# Patient Record
Sex: FEMALE | Race: BLACK OR AFRICAN AMERICAN | URBAN - METROPOLITAN AREA
[De-identification: names, ages, dates, MRNs, and addresses within clinical notes are randomized per-mention and may not be internally consistent; named-entity substitution may affect disease eponyms.]

---

## 2017-03-11 DIAGNOSIS — Z00.00 ROUTINE GENERAL MEDICAL EXAMINATION AT A HEALTH CARE FACILITY: ICD-10-CM

## 2017-03-27 ENCOUNTER — OFFICE VISIT (OUTPATIENT)
Dept: INTERNAL MEDICINE CLINIC | Age: 57
End: 2017-03-27

## 2017-03-27 VITALS
OXYGEN SATURATION: 98 % | HEART RATE: 58 BPM | DIASTOLIC BLOOD PRESSURE: 82 MMHG | SYSTOLIC BLOOD PRESSURE: 126 MMHG | BODY MASS INDEX: 30.76 KG/M2 | RESPIRATION RATE: 15 BRPM | HEIGHT: 67 IN | WEIGHT: 196 LBS | TEMPERATURE: 97.4 F

## 2017-03-27 DIAGNOSIS — Z00.00 ROUTINE GENERAL MEDICAL EXAMINATION AT A HEALTH CARE FACILITY: Primary | ICD-10-CM

## 2017-03-27 DIAGNOSIS — K21.9 GASTROESOPHAGEAL REFLUX DISEASE WITHOUT ESOPHAGITIS: ICD-10-CM

## 2017-03-27 DIAGNOSIS — I10 BENIGN HYPERTENSION: ICD-10-CM

## 2017-03-27 DIAGNOSIS — Z79.899 ENCOUNTER FOR LONG-TERM (CURRENT) DRUG USE: ICD-10-CM

## 2017-03-27 RX ORDER — AMLODIPINE AND VALSARTAN 5; 320 MG/1; MG/1
TABLET ORAL
Qty: 90 TAB | Refills: 1 | Status: SHIPPED | OUTPATIENT
Start: 2017-03-27 | End: 2017-09-25 | Stop reason: SDUPTHER

## 2017-03-27 RX ORDER — CHOLECALCIFEROL (VITAMIN D3) 125 MCG
5 CAPSULE ORAL
Qty: 90 TAB | Refills: 3 | Status: SHIPPED | OUTPATIENT
Start: 2017-03-27

## 2017-03-27 RX ORDER — BISOPROLOL FUMARATE AND HYDROCHLOROTHIAZIDE 10; 6.25 MG/1; MG/1
TABLET ORAL
Qty: 90 TAB | Refills: 1 | Status: SHIPPED | OUTPATIENT
Start: 2017-03-27 | End: 2017-09-25 | Stop reason: SDUPTHER

## 2017-03-27 RX ORDER — POLYETHYLENE GLYCOL 3350 17 G/17G
17 POWDER, FOR SOLUTION ORAL DAILY
Qty: 1 EACH | Refills: 11 | Status: SHIPPED | OUTPATIENT
Start: 2017-03-27 | End: 2017-09-25 | Stop reason: ALTCHOICE

## 2017-03-27 RX ORDER — CETIRIZINE HCL 10 MG
10 TABLET ORAL DAILY
Qty: 90 TAB | Refills: 3 | Status: SHIPPED | OUTPATIENT
Start: 2017-03-27

## 2017-03-27 NOTE — MR AVS SNAPSHOT
Visit Information Date & Time Provider Department Dept. Phone Encounter #  
 3/27/2017  8:20 AM Avis Ahumada MD [place] [id number] [facility] [phone] Follow-up Instructions Return in about 6 months (around 9/27/2017) for hypertension. Upcoming Health Maintenance Date Due  
 PAP AKA CERVICAL CYTOLOGY 4/1/2018 BREAST CANCER SCRN MAMMOGRAM 12/2/2018 COLONOSCOPY 5/9/2022 DTaP/Tdap/Td series (2 - Td) 7/9/2025 Allergies as of 3/27/2017  Review Complete On: 3/27/2017 By: Avis Ahumada MD  
  
 Severity Noted Reaction Type Reactions Ace Inhibitors  02/24/2012    Cough Aleve [Naproxen Sodium]  02/24/2012    Nausea and Vomiting Aspirin  02/24/2012    Nausea Only Ibuprofen  02/24/2012    Nausea and Vomiting Penicillins  02/24/2012    Vertigo  
 syncope Current Immunizations  Reviewed on 7/9/2015 Name Date Tdap 7/9/2015 Zoster Vaccine, Live 1/19/2014 Not reviewed this visit You Were Diagnosed With   
  
 Codes Comments Routine general medical examination at a health care facility    -  Primary ICD-10-CM: Z00.00 ICD-9-CM: V70.0 Vitals BP Pulse Temp Resp Height(growth percentile) Weight(growth percentile) 126/82 (!) 58 97.4 °F (36.3 °C) (Oral) 15 5' 6.5\" (1.689 m) 196 lb (88.9 kg) SpO2 BMI OB Status Smoking Status 98% 31.16 kg/m2 Hysterectomy Never Smoker Vitals History BMI and BSA Data Body Mass Index Body Surface Area  
 31.16 kg/m 2 2.04 m 2 Preferred Pharmacy Pharmacy Name Phone [id number] [address] [phone] Your Updated Medication List  
  
   
This list is accurate as of: 3/27/17  9:36 AM.  Always use your most recent med list.  
  
  
  
  
 acetaminophen 650 mg CR tablet Commonly known as:  TYLENOL ARTHRITIS  
2 tablets every 8 hours as needed. amLODIPine-valsartan 5-320 mg per tablet Commonly known as:  EXFORGE  
take 1 tablet by mouth daily Bifidobacterium Infantis 4 mg Cap Commonly known as:  ALIGN  
take 1 capsule by mouth once daily  
  
 bisoprolol-hydroCHLOROthiazide 10-6.25 mg per tablet Commonly known as:  ZIAC  
take 1 tablet by mouth once daily  
  
 cetirizine 10 mg tablet Commonly known as:  ZYRTEC Take 1 Tab by mouth daily. cholecalciferol 1,000 unit Cap Commonly known as:  VITAMIN D3 Take 1 Cap by mouth daily. FISH OIL 1,000 mg Cap Generic drug:  omega-3 fatty acids-vitamin e Take 1 Cap by mouth daily. hydrocort-pramox-skin clnsr#16 2.35-1 % Ktct Commonly known as:  ZYPRAM  
Apply bid as needed  
  
 licorice,deglycyrrhizin (bulk) Powd Take 1 Tab by mouth daily as needed (chewable tablet). melatonin Tab tablet Take 1 Tab by mouth nightly. NexIUM 24HR 22.3 mg Cpdr  
Generic drug:  esomeprazole magnesium  
take 1 capsule by mouth once daily  
  
 peg 400-propylene glycol (PF) 0.4-0.3 % Dpet ophthalmic solution Commonly known as:  SYSTANE Administer 2 Drops to both eyes as needed for Ocular Dryness. polyethylene glycol 17 gram packet Commonly known as:  Deliliah Hefty Take 1 Packet by mouth daily. raNITIdine 150 mg tablet Commonly known as:  ZANTAC  
take 1 tablet by mouth twice a day Prescriptions Sent to Pharmacy Refills  
 cetirizine (ZYRTEC) 10 mg tablet 3 Sig: Take 1 Tab by mouth daily. Class: Normal  
 Pharmacy: [facility] Ph #: [phone] Route: Oral  
 melatonin tab tablet 3 Sig: Take 1 Tab by mouth nightly. Class: Normal  
 Pharmacy: [facility] Ph #: [phone] Route: Oral  
 hydrocort-pramox-skin clnsr#16 (ZYPRAM) 2.35-1 % ktct 5 Sig: Apply bid as needed  Class: Normal  
 [facility] [address] [place] [address] E Ph #: [phone]  
 polyethylene glycol (MIRALAX) 17 gram packet 11 Sig: Take 1 Packet by mouth daily. Class: Normal  
 [facility] Ph #: [phone] Route: Oral  
 bisoprolol-hydroCHLOROthiazide (ZIAC) 10-6.25 mg per tablet 1 Sig: take 1 tablet by mouth once daily Class: Normal  
 Pharm[facility] [address] [place] [address] E Ph #: [phone]  
 amLODIPine-valsartan (EXFORGE) 5-320 mg per tablet 1 Sig: take 1 tablet by mouth daily Class: Normal  
 Pharmacy: [facility], [address] [place] [address] E Ph #: [phone] Bifidobacterium Infantis (ALIGN) 4 mg cap 1 Sig: take 1 capsule by mouth once daily Class: Normal  
 Pharmacy: [id number], [address] [place] [address] Ph #: [phone] We Performed the Following AMB POC EKG ROUTINE W/ 12 LEADS, INTER & REP [89765 CPT(R)] Follow-up Instructions Return in about 6 months (around 9/27/2017) for hypertension. Introducing [facility]! Olena Elder introduces Codesion patient portal. Now you can access parts of your medical record, email your doctor's office, and request medication refills online. 1. In your internet browser, go to [email or web address] Quantique 2. Click on the First Time User? Click Here link in the Sign In box. You will see the New Member Sign Up page. 3. Enter your Codesion Access Code exactly as it appears below. You will not need to use this code after youve completed the sign-up process. If you do not sign up before the expiration date, you must request a new code. · Codesion Access Code: [email or web address] Expires: 6/25/2017  9:16 AM 
 
4.  Enter the last four digits of your Social Security Number (xxxx) and Date of Birth (mm/dd/yyyy) as indicated and click Submit. You will be taken to the next sign-up page. 5. Create a 27 bards ID. This will be your 27 bards login ID and cannot be changed, so think of one that is secure and easy to remember. 6. Create a 27 bards password. You can change your password at any time. 7. Enter your Password Reset Question and Answer. This can be used at a later time if you forget your password. 8. Enter your e-mail address. You will receive e-mail notification when new information is available in [address] 9. Click Sign Up. You can now view and download portions of your medical record. 10. Click the Download Summary menu link to download a portable copy of your medical information. If you have questions, please visit the Frequently Asked Questions section of the 27 bards website. Remember, 27 bards is NOT to be used for urgent needs. For medical emergencies, dial 911. Now available from your iPhone and Android! Please provide this summary of care documentation to your next provider. Your primary care clinician is listed as Caterina Weston. If you have any questions after today's visit, please call [phone].

## 2017-03-27 NOTE — ACP (ADVANCE CARE PLANNING)
-the [place] Advanced Directive for Healthcare template given to patient for review and completion. Patient asked to provide us with a copy once it is completed.

## 2017-03-27 NOTE — PROGRESS NOTES
Subjective:      Lou Bañuelos is a 61 y.o. female who presents today for her annual and follow up of her hypertension. Gyn care is provided by Dr. Jenny Davidson. - last visit was 4/2016  Screening mammogram [facility] at [facility] - date- 12/2/16  Screening colonoscopy was last completed with Dr. Pieter Haas in 2012    Exercise -  Walking every day    She would like to switch her zantac to prilosec. She has quite a bit of food allergies and is already limited in what she eats, but she still has quite a bit of reflux symptoms when she stops her zantac. Patient Active Problem List   Diagnosis Code    Pap smear for cervical cancer screening Z12.4    History of screening mammography Z92.89    HTN (hypertension) I10    Environmental allergies Z91.09    Hyperlipemia E78.5    Hiatal hernia K44.9    Screen for colon cancer Z12.11    Vitamin D deficiency E55.9    GERD (gastroesophageal reflux disease) K21.9     Current Outpatient Prescriptions   Medication Sig Dispense Refill    cetirizine (ZYRTEC) 10 mg tablet Take 1 Tab by mouth daily. 90 Tab 3    melatonin tab tablet Take 1 Tab by mouth nightly. 90 Tab 3    hydrocort-pramox-skin clnsr#16 (ZYPRAM) 2.35-1 % ktct Apply bid as needed 1 Each 5    polyethylene glycol (MIRALAX) 17 gram packet Take 1 Packet by mouth daily. 1 Each 11    bisoprolol-hydroCHLOROthiazide (ZIAC) 10-6.25 mg per tablet take 1 tablet by mouth once daily 90 Tab 1    amLODIPine-valsartan (EXFORGE) 5-320 mg per tablet take 1 tablet by mouth daily 90 Tab 1    Bifidobacterium Infantis (ALIGN) 4 mg cap take 1 capsule by mouth once daily 90 Cap 1    LICORICE,DEGLYCYRRHIZINATED (LICORICE,DEGLYCYRRHIZIN, BULK,) powd Take 1 Tab by mouth daily as needed (chewable tablet). 30 Tab 12    omega-3 fatty acids-vitamin e (FISH OIL) 1,000 mg cap Take 1 Cap by mouth daily.  30 Cap 12    ranitidine (ZANTAC) 150 mg tablet take 1 tablet by mouth twice a day 180 Tab 1    NEXIUM 24HR 22.3 mg capsule take 1 capsule by mouth once daily 42 Cap 5    acetaminophen (TYLENOL ARTHRITIS) 650 mg CR tablet 2 tablets every 8 hours as needed. 50 Each 11    Cholecalciferol, Vitamin D3, (VITAMIN D3) 1,000 unit cap Take 1 Cap by mouth daily. 100 Cap prn    peg 400-propylene glycol (SYSTANE) 0.4-0.3 % Dpet ophthalmic solution Administer 2 Drops to both eyes as needed for Ocular Dryness. 1 Each 11        Review of Systems    A comprehensive review of systems was negative except for that written in the HPI. Objective: Wt Readings from Last 3 Encounters:   03/27/17 196 lb (88.9 kg)   09/12/16 196 lb (88.9 kg)   03/04/16 200 lb (90.7 kg)     Temp Readings from Last 3 Encounters:   03/27/17 97.4 °F (36.3 °C) (Oral)   09/12/16 98 °F (36.7 °C) (Oral)   03/04/16 98.1 °F (36.7 °C) (Oral)     BP Readings from Last 3 Encounters:   03/27/17 (!) 132/94 recheck 126/82   09/12/16 126/80   03/04/16 135/85     Pulse Readings from Last 3 Encounters:   03/27/17 (!) 58   09/12/16 89   03/04/16 62      Visit Vitals    /82    Pulse (!) 58    Temp 97.4 °F (36.3 °C) (Oral)    Resp 15    Ht 5' 6.5\" (1.689 m)    Wt 196 lb (88.9 kg)    SpO2 98%    BMI 31.16 kg/m2     General:  Alert, cooperative, no distress, appears stated age. Head:  Normocephalic, without obvious abnormality, atraumatic. Eyes:  Conjunctivae/corneas clear. PERRL, EOMs intact. Ears:  Normal TMs and external ear canals both ears. Nose: Nares normal. Septum midline. Mucosa normal. No drainage or sinus tenderness. Throat: Lips, mucosa, and tongue normal. Teeth and gums normal.   Neck: Supple, symmetrical, trachea midline, no adenopathy, thyroid: no enlargement/tenderness/nodules, no carotid bruit and no JVD. Back:   Symmetric, no curvature. ROM normal. No CVA tenderness. Lungs:   Clear to auscultation bilaterally. Chest wall:  No tenderness or deformity. Heart:  Regular rate and rhythm, S1, S2 normal, no murmur, click, rub or gallop.    Breast Exam:  No tenderness, masses, or nipple abnormality. Abdomen:   Soft, non-tender. Bowel sounds normal. No masses,  No organomegaly. Extremities: Extremities normal, atraumatic, no cyanosis or edema. Pulses: 2+ and symmetric all extremities. Skin: Skin color, texture, turgor normal. No rashes or lesions. Lymph nodes: Cervical, supraclavicular, and axillary nodes normal.   Neurologic: CNII-XII intact. Normal strength, sensation and reflexes throughout. Assessment/Plan:     1. Routine general medical examination at a health care facility  -labs drawn prior to office visit. Results discussed with patient.   -due for screening colonoscopy this year.   -up to date with screening mammogram  -up to date with immunization.     - AMB POC EKG ROUTINE W/ 12 LEADS, INTER & REP    Also, she has additional complaints of the following --.     2. Gastroesophageal reflux disease without esophagitis  -discussed at length the potential of developing renal failure and dementia with long term use of prilosec.   -strongly recommended she reevaluate her diet and cut out foods that aggrevate her GERD. She is concerned that with her many food allergies, she may have a very limited diet.   -may continue use of zantac for now    3. Benign hypertension  -I evaluated and recommended to continue current doses of medications. 4. Encounter for long-term (current) drug use    Orders Placed This Encounter    AMB POC EKG ROUTINE W/ 12 LEADS, INTER & REP     Order Specific Question:   Reason for Exam:     Answer:   complete physical    cetirizine (ZYRTEC) 10 mg tablet     Sig: Take 1 Tab by mouth daily. Dispense:  90 Tab     Refill:  3    melatonin tab tablet     Sig: Take 1 Tab by mouth nightly.      Dispense:  90 Tab     Refill:  3    hydrocort-pramox-skin clnsr#16 (ZYPRAM) 2.35-1 % ktct     Sig: Apply bid as needed     Dispense:  1 Each     Refill:  5    polyethylene glycol (MIRALAX) 17 gram packet     Sig: Take 1 Packet by mouth daily.     Dispense:  1 Each     Refill:  11    bisoprolol-hydroCHLOROthiazide (ZIAC) 10-6.25 mg per tablet     Sig: take 1 tablet by mouth once daily     Dispense:  90 Tab     Refill:  1    amLODIPine-valsartan (EXFORGE) 5-320 mg per tablet     Sig: take 1 tablet by mouth daily     Dispense:  90 Tab     Refill:  1    Bifidobacterium Infantis (ALIGN) 4 mg cap     Sig: take 1 capsule by mouth once daily     Dispense:  90 Cap     Refill:  1          Follow-up Disposition:     Follow up in 6 months     Return if symptoms worsen or fail to improve. Advised patient to call back or return to office if symptoms worsen/change/persist.     Discussed expected course/resolution/complications of diagnosis in detail with patient. Medication risks/benefits/costs/interactions/alternatives discussed with patient. Patient was given an after visit summary which includes diagnoses, current medications, & vitals. Patient expressed understanding with the diagnosis and plan.

## 2017-03-31 ENCOUNTER — TELEPHONE (OUTPATIENT)
Dept: INTERNAL MEDICINE CLINIC | Age: 57
End: 2017-03-31

## 2017-03-31 NOTE — TELEPHONE ENCOUNTER
Received incoming fax from [address] [place] [address] notifying PCP that Zypram is no longer in the market. Called pt to advise of this information and obtain further information on what she would like to switch to. Pt states that she is willing to switch to whatever medication Dr. Coni Lundborg finds as a good alternative.

## 2017-04-03 NOTE — TELEPHONE ENCOUNTER
Call to Ms. Hannah Pierce and advised that an Rx for preparation H had been sent to her local pharmacy. Ms. Hannah Pierce acknowledged understanding and all questions were answered to patients satisfaction. No further questions or concerns at this time.

## 2017-09-25 ENCOUNTER — OFFICE VISIT (OUTPATIENT)
Dept: INTERNAL MEDICINE CLINIC | Age: 57
End: 2017-09-25

## 2017-09-25 ENCOUNTER — TELEPHONE (OUTPATIENT)
Dept: INTERNAL MEDICINE CLINIC | Age: 57
End: 2017-09-25

## 2017-09-25 VITALS
OXYGEN SATURATION: 97 % | DIASTOLIC BLOOD PRESSURE: 86 MMHG | HEIGHT: 67 IN | RESPIRATION RATE: 14 BRPM | HEART RATE: 73 BPM | SYSTOLIC BLOOD PRESSURE: 130 MMHG | WEIGHT: 193 LBS | TEMPERATURE: 98.8 F | BODY MASS INDEX: 30.29 KG/M2

## 2017-09-25 DIAGNOSIS — G47.9 SLEEP DISTURBANCE: ICD-10-CM

## 2017-09-25 DIAGNOSIS — K64.9 HEMORRHOIDS, UNSPECIFIED HEMORRHOID TYPE: ICD-10-CM

## 2017-09-25 DIAGNOSIS — I10 BENIGN HYPERTENSION: Primary | ICD-10-CM

## 2017-09-25 DIAGNOSIS — Z12.11 COLON CANCER SCREENING: ICD-10-CM

## 2017-09-25 DIAGNOSIS — Z79.899 ENCOUNTER FOR LONG-TERM (CURRENT) USE OF MEDICATIONS: ICD-10-CM

## 2017-09-25 RX ORDER — AMLODIPINE AND VALSARTAN 5; 320 MG/1; MG/1
TABLET ORAL
Qty: 90 TAB | Refills: 1 | Status: SHIPPED | OUTPATIENT
Start: 2017-09-25 | End: 2017-10-02 | Stop reason: SDUPTHER

## 2017-09-25 RX ORDER — BISOPROLOL FUMARATE AND HYDROCHLOROTHIAZIDE 10; 6.25 MG/1; MG/1
TABLET ORAL
Qty: 90 TAB | Refills: 1 | Status: SHIPPED | OUTPATIENT
Start: 2017-09-25 | End: 2017-10-02 | Stop reason: SDUPTHER

## 2017-09-25 RX ORDER — DEXTROMETHORPHAN HYDROBROMIDE, GUAIFENESIN 5; 100 MG/5ML; MG/5ML
LIQUID ORAL
Qty: 100 TAB | Refills: 3 | Status: SHIPPED | OUTPATIENT
Start: 2017-09-25

## 2017-09-25 RX ORDER — ACETAMINOPHEN/DIPHENHYDRAMINE 500MG-25MG
TABLET ORAL
Qty: 60 TAB | Refills: 11 | Status: SHIPPED | OUTPATIENT
Start: 2017-09-25

## 2017-09-25 RX ORDER — GLUCOSAMINE SULFATE 1500 MG
1000 POWDER IN PACKET (EA) ORAL DAILY
Qty: 100 CAP | Refills: 3 | Status: SHIPPED | OUTPATIENT
Start: 2017-09-25

## 2017-09-25 NOTE — PROGRESS NOTES
Chief Complaint   Patient presents with    Hypertension     6 MONTH FOLLOW UP     1. Have you been to the ER, urgent care clinic since your last visit? Hospitalized since your last visit? No    2. Have you seen or consulted any other health care providers outside of [facility] [place] since your last visit? Include any pap smears or colon screening.  No

## 2017-09-25 NOTE — PROGRESS NOTES
Subjective:      Edwige Alba is a 61 y.o. female who presents today for follow up of her hypertension. She wakes every night between 1-3am.  Has been ongoing for some time. She denies any chest pain, shortness of breath, syncope, headaches, nausea/vomiting, or any bowel changes. She is due for her screening colonoscopy, but would like to see a colorectal surgeon such that they can do something about her hemorrhoids as well. She would like to see Dr. Gab Sommer. Patient Active Problem List   Diagnosis Code    Pap smear for cervical cancer screening Z12.4    History of screening mammography Z92.89    HTN (hypertension) I10    Environmental allergies Z91.09    Hyperlipemia E78.5    Hiatal hernia K44.9    Screen for colon cancer Z12.11    Vitamin D deficiency E55.9    GERD (gastroesophageal reflux disease) K21.9     Current Outpatient Prescriptions   Medication Sig Dispense Refill    bisoprolol-hydroCHLOROthiazide (ZIAC) 10-6.25 mg per tablet take 1 tablet by mouth once daily 90 Tab 1    amLODIPine-valsartan (EXFORGE) 5-320 mg per tablet take 1 tablet by mouth daily 90 Tab 1    acetaminophen (TYLENOL ARTHRITIS) 650 mg CR tablet 2 tablets every 8 hours as needed. 100 Tab 3    cholecalciferol (VITAMIN D3) 1,000 unit cap Take 1 Cap by mouth daily. 100 Cap 3    diphenhydrAMINE-acetaminophen (TYLENOL PM EXTRA STRENGTH)  mg tab 1 -2 tab at bedtime as needed 60 Tab 11    ALIGN 4 mg cap take 1 capsule by mouth once daily 84 Cap 3    raNITIdine (ZANTAC) 150 mg tablet take 1 tablet by mouth twice a day 180 Tab 1    cetirizine (ZYRTEC) 10 mg tablet Take 1 Tab by mouth daily. 90 Tab 3    melatonin tab tablet Take 1 Tab by mouth nightly. 90 Tab 3    omega-3 fatty acids-vitamin e (FISH OIL) 1,000 mg cap Take 1 Cap by mouth daily. 30 Cap 12    peg 400-propylene glycol (SYSTANE) 0.4-0.3 % Dpet ophthalmic solution Administer 2 Drops to both eyes as needed for Ocular Dryness.  1 Each 11 Review of Systems    Pertinent items are noted in HPI. Objective:     Visit Vitals    /86 (BP 1 Location: Left arm, BP Patient Position: Sitting)    Pulse 73    Temp 98.8 °F (37.1 °C) (Oral)    Resp 14    Ht 5' 6.5\" (1.689 m)    Wt 193 lb (87.5 kg)    SpO2 97%    BMI 30.68 kg/m2     General appearance: alert, cooperative, no distress, appears stated age  Head: Normocephalic, without obvious abnormality, atraumatic  Neck: supple, symmetrical, trachea midline, no adenopathy, no carotid bruit and no JVD  Lungs: clear to auscultation bilaterally  Heart: regular rate and rhythm, S1, S2 normal, no murmur, click, rub or gallop  Abdomen: soft, non-tender. Bowel sounds normal. No masses,  no organomegaly  Extremities: extremities normal, atraumatic, no cyanosis or edema  Pulses: 2+ and symmetric    Assessment/Plan:     1. Hemorrhoids, unspecified hemorrhoid type  -referred to Dr. Rosey Soto    2. Colon cancer screening  -due at this time. Will get done with Dr. Rosey Soto    3. Benign hypertension  -I evaluated and recommended to continue current doses of medications. 4. Encounter for long-term (current) use of medications    5.  Sleep disturbance  -recommended good sleep hygiene  -recommended use of tylenol pm 1-2 tab at bedtime for next month to help reset sleep pattern    Orders Placed This Encounter    REFERRAL TO COLON AND RECTAL SURGERY     Referral Priority:   Routine     Referral Type:   Consultation     Referral Reason:   Specialty Services Required     Referred to Provider:   Jono Sarabia MD     Requested Specialty:   Colon and Rectal Surgery    bisoprolol-hydroCHLOROthiazide [facility]) 10-6.25 mg per tablet     Sig: take 1 tablet by mouth once daily     Dispense:  90 Tab     Refill:  1    amLODIPine-valsartan (EXFORGE) 5-320 mg per tablet     Sig: take 1 tablet by mouth daily     Dispense:  90 Tab Refill:  1    acetaminophen (TYLENOL ARTHRITIS) 650 mg CR tablet     Si tablets every 8 hours as needed. Dispense:  100 Tab     Refill:  3    cholecalciferol (VITAMIN D3) 1,000 unit cap     Sig: Take 1 Cap by mouth daily. Dispense:  100 Cap     Refill:  3    diphenhydrAMINE-acetaminophen (TYLENOL PM EXTRA STRENGTH)  mg tab     Si -2 tab at bedtime as needed     Dispense:  60 Tab     Refill:  11          Follow-up Disposition:     Follow up in 6 months     Return if symptoms worsen or fail to improve. Advised patient to call back or return to office if symptoms worsen/change/persist.     Discussed expected course/resolution/complications of diagnosis in detail with patient. Medication risks/benefits/costs/interactions/alternatives discussed with patient. Patient was given an after visit summary which includes diagnoses, current medications, & vitals. Patient expressed understanding with the diagnosis and plan.

## 2017-09-25 NOTE — MR AVS SNAPSHOT
Visit Information Date & Time Provider Department Dept. Phone Encounter #  
 9/25/2017 11:40 AM Amanda Jackman MD [facility] [phone] Follow-up Instructions Return in about 6 months (around 3/25/2018) for hypertension. Upcoming Health Maintenance Date Due  
 PAP AKA CERVICAL CYTOLOGY 4/1/2018 BREAST CANCER SCRN MAMMOGRAM 12/2/2018 COLONOSCOPY 5/9/2022 DTaP/Tdap/Td series (2 - Td) 7/9/2025 Allergies as of 9/25/2017  Review Complete On: 9/25/2017 By: Amanda Jackman MD  
  
 Severity Noted Reaction Type Reactions Ace Inhibitors  02/24/2012    Cough Aleve [Naproxen Sodium]  02/24/2012    Nausea and Vomiting Aspirin  02/24/2012    Nausea Only Ibuprofen  02/24/2012    Nausea and Vomiting Penicillins  02/24/2012    Vertigo  
 syncope Current Immunizations  Reviewed on 7/9/2015 Name Date Tdap 7/9/2015 Zoster Vaccine, Live 1/19/2014 Not reviewed this visit You Were Diagnosed With   
  
 Codes Comments Benign hypertension    -  Primary ICD-10-CM: I10 
ICD-9-CM: 401.1 Hemorrhoids, unspecified hemorrhoid type     ICD-10-CM: K64.9 ICD-9-CM: 455.6 Colon cancer screening     ICD-10-CM: Z12.11 ICD-9-CM: V76.51 Encounter for long-term (current) use of medications     ICD-10-CM: Z79.899 ICD-9-CM: V58.69 Essential hypertension with goal blood pressure less than 130/85     ICD-10-CM: I10 
ICD-9-CM: 401.9 Vitals BP Pulse Temp Resp Height(growth percentile) Weight(growth percentile) 130/86 (BP 1 Location: Left arm, BP Patient Position: Sitting) 73 98.8 °F (37.1 °C) (Oral) 14 5' 6.5\" (1.689 m) 193 lb (87.5 kg) SpO2 BMI OB Status Smoking Status 97% 30.68 kg/m2 Hysterectomy Never Smoker BMI and BSA Data Body Mass Index Body Surface Area  
 30.68 kg/m 2 2.03 m 2 Preferred Pharmacy Pharmacy Name Phone [id number]  [address] [address] [place] [phone] Your Updated Medication List  
  
   
This list is accurate as of: 17 12:41 PM.  Always use your most recent med list.  
  
  
  
  
 acetaminophen 650 mg CR tablet Commonly known as:  TYLENOL ARTHRITIS  
2 tablets every 8 hours as needed. ALIGN 4 mg Cap Generic drug:  Bifidobacterium Infantis  
take 1 capsule by mouth once daily  
  
 amLODIPine-valsartan 5-320 mg per tablet Commonly known as:  EXFORGE  
take 1 tablet by mouth daily  
  
 bisoprolol-hydroCHLOROthiazide 10-6.25 mg per tablet Commonly known as:  ZIAC  
take 1 tablet by mouth once daily  
  
 cetirizine 10 mg tablet Commonly known as:  ZYRTEC Take 1 Tab by mouth daily. cholecalciferol 1,000 unit Cap Commonly known as:  VITAMIN D3 Take 1 Cap by mouth daily. diphenhydrAMINE-acetaminophen  mg Tab Commonly known as:  TYLENOL PM EXTRA STRENGTH  
1 -2 tab at bedtime as needed FISH OIL 1,000 mg Cap Generic drug:  omega-3 fatty acids-vitamin e Take 1 Cap by mouth daily. melatonin Tab tablet Take 1 Tab by mouth nightly. peg 400-propylene glycol (PF) 0.4-0.3 % Dpet ophthalmic solution Commonly known as:  SYSTANE Administer 2 Drops to both eyes as needed for Ocular Dryness. raNITIdine 150 mg tablet Commonly known as:  ZANTAC  
take 1 tablet by mouth twice a day Prescriptions Printed Refills  
 acetaminophen (TYLENOL ARTHRITIS) 650 mg CR tablet 3 Si tablets every 8 hours as needed. Class: Print  
 cholecalciferol (VITAMIN D3) 1,000 unit cap 3 Sig: Take 1 Cap by mouth daily. Class: Print Route: Oral  
 diphenhydrAMINE-acetaminophen (TYLENOL PM EXTRA STRENGTH)  mg tab 11 Si -2 tab at bedtime as needed Class: Print Prescriptions Sent to Pharmacy  Refills  
 bisoprolol-hydroCHLOROthiazide (ZIAC) 10-6.25 mg per tablet 1  
 Sig: take 1 tablet by mouth once daily Class: Normal  
 [id number], [address]  #: [phone]  
 amLODIPine-valsartan (EXFORGE) 5-320 mg per tablet 1 Sig: take 1 tablet by mouth daily Class: Normal  
 [id number] [address] Ph #: [phone] We Performed the Following REFERRAL TO COLON AND RECTAL SURGERY [REF17 Custom] Comments:  
 Please evaluate patient for -screening colonoscopy and hemorrhoids. Follow-up Instructions Return in about 6 months (around 3/25/2018) for hypertension. Referral Information Referral ID Referred By Referred To  
  
 [id number] Americo GALARZA MD   
   [address] [place] [address] Phone: [phone] Fax: [phone] Visits Status Start Date End Date 1 New Request 9/25/17 9/25/18 If your referral has a status of pending review or denied, additional information will be sent to support the outcome of this decision. Introducing [facility]! Kwaku Zarate introduces [employer] patient portal. Now you can access parts of your medical record, email your doctor's office, and request medication refills online. 1. In your internet browser, go to [email or web address] 2. Click on the First Time User? Click Here link in the Sign In box. You will see the New Member Sign Up page. 3. Enter your [employer] Access Code exactly as it appears below. You will not need to use this code after youve completed the sign-up process. If you do not sign up before the expiration date, you must request a new code. · [employer] Access Code: [email or web address] Expires: 12/24/2017 12:23 PM 
 
4. Enter the last four digits of your Social Security Number (xxxx) and Date of Birth (mm/dd/yyyy) as indicated and click Submit. You will be taken to the next sign-up page. 5. Create a [employer] ID. This will be your [employer] login ID and cannot be changed, so think of one that is secure and easy to remember. 6. Create a [employer] password. You can change your password at any time. 7. Enter your Password Reset Question and Answer. This can be used at a later time if you forget your password. 8. Enter your e-mail address. You will receive e-mail notification when new information is available in [address] 9. Click Sign Up. You can now view and download portions of your medical record. 10. Click the Download Summary menu link to download a portable copy of your medical information. If you have questions, please visit the Frequently Asked Questions section of the [employer] website. Remember, [employer] is NOT to be used for urgent needs. For medical emergencies, dial 911. Now available from your iPhone and Android! Please provide this summary of care documentation to your next provider. Your primary care clinician is listed as Caterina Weston. If you have any questions after today's visit, please call [phone].

## 2017-09-25 NOTE — TELEPHONE ENCOUNTER
Patient's referral for colonoscopy was placed on my desk when I was at lunch, patient did not receive. After discussing with Dr. Anraud Torres, was advised to contact patient and mail this. Spoke with patient, two identifiers verified. States she didn't know there was a paper and she is scheduled 10/19/17 for consult at GI, was instructed to contact us a few days prior to complete authorization for insurance. Advised that is fine. Referral sent to patient via mail per her request.  To call with further questions/concerns. Patient verbalizes understanding.

## 2017-10-02 RX ORDER — BISOPROLOL FUMARATE AND HYDROCHLOROTHIAZIDE 10; 6.25 MG/1; MG/1
TABLET ORAL
Qty: 90 TAB | Refills: 1 | Status: SHIPPED | OUTPATIENT
Start: 2017-10-02 | End: 2018-09-29 | Stop reason: SDUPTHER

## 2017-10-02 RX ORDER — AMLODIPINE AND VALSARTAN 5; 320 MG/1; MG/1
TABLET ORAL
Qty: 90 TAB | Refills: 1 | Status: SHIPPED | OUTPATIENT
Start: 2017-10-02 | End: 2018-09-29 | Stop reason: SDUPTHER

## 2018-01-16 ENCOUNTER — TELEPHONE (OUTPATIENT)
Dept: INTERNAL MEDICINE CLINIC | Age: 58
End: 2018-01-16

## 2018-01-16 NOTE — TELEPHONE ENCOUNTER
Patient called because  was not going to be in the office for her 04/02/2018 visit. She wanted to see . I explained to the patient that the physicians do not see each others patients for follow up appointments. She mentioned wanting to switch to . I told her I would have to send him a message to see if he would accept her as a patient. She was not very pleased with either of these responses. She wanted me to let  know that we had spoken.

## 2018-01-16 NOTE — TELEPHONE ENCOUNTER
Pt would like to switch to Dr. Edwina Martinez. Per office policy, the message will be sent to him and he will further advise if he is accepting new pt's at this time. The pt will be notified once further information is given from the provider.     Verbally discuss with Dr. Peg Kramer. She is aware of pt's request.

## 2018-01-19 NOTE — TELEPHONE ENCOUNTER
I don't think this would be appropriate to switch doctors because her appointment time was rescheduled. She can see Carline Turner if she must have an appointment on that date. I do not want to take patients from physicians within the practice.

## 2018-02-01 NOTE — TELEPHONE ENCOUNTER
PSR's received call from pt - pt notes that she DOES NOT want to switch providers. She was requesting to see any provider within the office on the same day she was previously scheduled with Dr. Leonetta Mcardle, as she was already scheduled off from work. Pt's request has been addressed and she is scheduled with SARAHE,NP. No further questions and/or concerns at this time.

## 2019-08-19 RX ORDER — AMLODIPINE AND VALSARTAN 5; 320 MG/1; MG/1
TABLET ORAL
Qty: 90 TAB | Refills: 1 | Status: SHIPPED | OUTPATIENT
Start: 2019-08-19 | End: 2019-08-19 | Stop reason: CLARIF

## 2019-08-19 RX ORDER — BISOPROLOL FUMARATE AND HYDROCHLOROTHIAZIDE 10; 6.25 MG/1; MG/1
TABLET ORAL
Qty: 90 TAB | Refills: 1 | Status: SHIPPED | OUTPATIENT
Start: 2019-08-19 | End: 2019-08-19 | Stop reason: CLARIF

## 2019-08-19 NOTE — TELEPHONE ENCOUNTER
Patient has not been seen since 2017. Refills for exforge and ziac sent to pharmacy accidentally. Called [facility] and cancelled as patient needs follow up for refills.